# Patient Record
Sex: FEMALE | NOT HISPANIC OR LATINO | URBAN - METROPOLITAN AREA
[De-identification: names, ages, dates, MRNs, and addresses within clinical notes are randomized per-mention and may not be internally consistent; named-entity substitution may affect disease eponyms.]

---

## 2017-03-16 PROBLEM — J44.9 CHRONIC OBSTRUCTIVE PULMONARY DISEASE, UNSPECIFIED COPD TYPE (HCC): Status: ACTIVE | Noted: 2017-03-16

## 2017-03-29 PROBLEM — N87.0 CIN I (CERVICAL INTRAEPITHELIAL NEOPLASIA I): Status: ACTIVE | Noted: 2017-03-29

## 2017-03-29 PROCEDURE — 88175 CYTOPATH C/V AUTO FLUID REDO: CPT | Performed by: OBSTETRICS & GYNECOLOGY

## 2017-03-29 PROCEDURE — 87624 HPV HI-RISK TYP POOLED RSLT: CPT | Performed by: OBSTETRICS & GYNECOLOGY

## 2017-04-28 PROCEDURE — 88305 TISSUE EXAM BY PATHOLOGIST: CPT | Performed by: OBSTETRICS & GYNECOLOGY

## 2021-04-12 DIAGNOSIS — Z23 NEED FOR VACCINATION: ICD-10-CM

## 2022-12-12 ENCOUNTER — WALK IN (OUTPATIENT)
Dept: URGENT CARE | Age: 62
End: 2022-12-12

## 2022-12-12 VITALS
WEIGHT: 143 LBS | BODY MASS INDEX: 26.31 KG/M2 | SYSTOLIC BLOOD PRESSURE: 142 MMHG | RESPIRATION RATE: 28 BRPM | TEMPERATURE: 97.6 F | DIASTOLIC BLOOD PRESSURE: 80 MMHG | HEART RATE: 104 BPM | HEIGHT: 62 IN | OXYGEN SATURATION: 95 %

## 2022-12-12 DIAGNOSIS — J44.9 CHRONIC OBSTRUCTIVE PULMONARY DISEASE, UNSPECIFIED COPD TYPE (CMD): ICD-10-CM

## 2022-12-12 DIAGNOSIS — J45.909 BRONCHITIS WITH ASTHMA, ACUTE: Primary | ICD-10-CM

## 2022-12-12 DIAGNOSIS — J45.51 SEVERE PERSISTENT ASTHMA WITH ACUTE EXACERBATION: ICD-10-CM

## 2022-12-12 DIAGNOSIS — J20.9 BRONCHITIS WITH ASTHMA, ACUTE: Primary | ICD-10-CM

## 2022-12-12 DIAGNOSIS — J06.9 VIRAL UPPER RESPIRATORY TRACT INFECTION WITH COUGH: ICD-10-CM

## 2022-12-12 PROCEDURE — 99213 OFFICE O/P EST LOW 20 MIN: CPT | Performed by: NURSE PRACTITIONER

## 2022-12-12 RX ORDER — MONTELUKAST SODIUM 10 MG/1
TABLET ORAL
COMMUNITY
Start: 2022-09-07

## 2022-12-12 RX ORDER — FLUTICASONE PROPIONATE AND SALMETEROL 500; 50 UG/1; UG/1
1 POWDER RESPIRATORY (INHALATION)
COMMUNITY
Start: 2022-09-07 | End: 2022-12-12 | Stop reason: SDUPTHER

## 2022-12-12 RX ORDER — TIOTROPIUM BROMIDE 18 UG/1
CAPSULE ORAL; RESPIRATORY (INHALATION)
COMMUNITY
Start: 2022-10-18

## 2022-12-12 RX ORDER — BENZONATATE 100 MG/1
CAPSULE ORAL
Qty: 40 CAPSULE | Refills: 0 | Status: SHIPPED | OUTPATIENT
Start: 2022-12-12

## 2022-12-12 RX ORDER — PREDNISONE 20 MG/1
40 TABLET ORAL DAILY
Qty: 10 TABLET | Refills: 0 | Status: SHIPPED | OUTPATIENT
Start: 2022-12-12

## 2022-12-12 RX ORDER — FLUTICASONE PROPIONATE AND SALMETEROL 500; 50 UG/1; UG/1
1 POWDER RESPIRATORY (INHALATION) 2 TIMES DAILY
COMMUNITY

## 2022-12-12 RX ORDER — CETIRIZINE HYDROCHLORIDE 10 MG/1
TABLET ORAL
COMMUNITY
End: 2022-12-12 | Stop reason: SDUPTHER

## 2022-12-12 RX ORDER — ALBUTEROL SULFATE 90 UG/1
1-2 AEROSOL, METERED RESPIRATORY (INHALATION)
COMMUNITY
Start: 2022-10-17

## 2022-12-12 ASSESSMENT — ENCOUNTER SYMPTOMS
SHORTNESS OF BREATH: 0
FATIGUE: 1
CHILLS: 0
GASTROINTESTINAL NEGATIVE: 1
TROUBLE SWALLOWING: 0
HEADACHES: 1
PSYCHIATRIC NEGATIVE: 1
DIAPHORESIS: 0
SINUS PAIN: 0
EYES NEGATIVE: 1
RHINORRHEA: 1
SINUS PRESSURE: 0
SORE THROAT: 0
COUGH: 1
WHEEZING: 1
FEVER: 0

## 2024-03-01 RX ORDER — ROSUVASTATIN CALCIUM 20 MG/1
20 TABLET, COATED ORAL NIGHTLY
COMMUNITY

## 2024-03-01 RX ORDER — TIOTROPIUM BROMIDE 18 UG/1
18 CAPSULE ORAL; RESPIRATORY (INHALATION) DAILY
COMMUNITY

## 2024-03-01 RX ORDER — AZELASTINE 1 MG/ML
1 SPRAY, METERED NASAL 2 TIMES DAILY
COMMUNITY

## 2024-03-11 ENCOUNTER — HOSPITAL ENCOUNTER (OUTPATIENT)
Facility: HOSPITAL | Age: 64
Setting detail: HOSPITAL OUTPATIENT SURGERY
Discharge: HOME | End: 2024-03-11
Attending: INTERNAL MEDICINE | Admitting: INTERNAL MEDICINE
Payer: COMMERCIAL

## 2024-03-11 ENCOUNTER — ANESTHESIA EVENT (OUTPATIENT)
Dept: ENDOSCOPY | Facility: HOSPITAL | Age: 64
End: 2024-03-11
Payer: COMMERCIAL

## 2024-03-11 ENCOUNTER — ANESTHESIA (OUTPATIENT)
Dept: ENDOSCOPY | Facility: HOSPITAL | Age: 64
End: 2024-03-11
Payer: COMMERCIAL

## 2024-03-11 VITALS
OXYGEN SATURATION: 98 % | HEIGHT: 62 IN | WEIGHT: 149 LBS | DIASTOLIC BLOOD PRESSURE: 81 MMHG | BODY MASS INDEX: 27.42 KG/M2 | TEMPERATURE: 98 F | HEART RATE: 87 BPM | RESPIRATION RATE: 20 BRPM | SYSTOLIC BLOOD PRESSURE: 142 MMHG

## 2024-03-11 PROCEDURE — 0DBK8ZX EXCISION OF ASCENDING COLON, VIA NATURAL OR ARTIFICIAL OPENING ENDOSCOPIC, DIAGNOSTIC: ICD-10-PCS | Performed by: INTERNAL MEDICINE

## 2024-03-11 PROCEDURE — 0DBC8ZX EXCISION OF ILEOCECAL VALVE, VIA NATURAL OR ARTIFICIAL OPENING ENDOSCOPIC, DIAGNOSTIC: ICD-10-PCS | Performed by: INTERNAL MEDICINE

## 2024-03-11 PROCEDURE — 88305 TISSUE EXAM BY PATHOLOGIST: CPT | Performed by: INTERNAL MEDICINE

## 2024-03-11 DEVICE — REPLAY HEMOSTASIS CLIP, 11MM SPAN
Type: IMPLANTABLE DEVICE | Status: FUNCTIONAL
Brand: REPLAY

## 2024-03-11 RX ORDER — HYDROMORPHONE HYDROCHLORIDE 1 MG/ML
0.2 INJECTION, SOLUTION INTRAMUSCULAR; INTRAVENOUS; SUBCUTANEOUS EVERY 5 MIN PRN
Status: DISCONTINUED | OUTPATIENT
Start: 2024-03-11 | End: 2024-03-11

## 2024-03-11 RX ORDER — LABETALOL HYDROCHLORIDE 5 MG/ML
5 INJECTION, SOLUTION INTRAVENOUS EVERY 5 MIN PRN
Status: DISCONTINUED | OUTPATIENT
Start: 2024-03-11 | End: 2024-03-11

## 2024-03-11 RX ORDER — HYDROMORPHONE HYDROCHLORIDE 1 MG/ML
0.4 INJECTION, SOLUTION INTRAMUSCULAR; INTRAVENOUS; SUBCUTANEOUS EVERY 5 MIN PRN
Status: DISCONTINUED | OUTPATIENT
Start: 2024-03-11 | End: 2024-03-11

## 2024-03-11 RX ORDER — NALOXONE HYDROCHLORIDE 0.4 MG/ML
80 INJECTION, SOLUTION INTRAMUSCULAR; INTRAVENOUS; SUBCUTANEOUS AS NEEDED
Status: DISCONTINUED | OUTPATIENT
Start: 2024-03-11 | End: 2024-03-11

## 2024-03-11 RX ORDER — SODIUM CHLORIDE, SODIUM LACTATE, POTASSIUM CHLORIDE, CALCIUM CHLORIDE 600; 310; 30; 20 MG/100ML; MG/100ML; MG/100ML; MG/100ML
INJECTION, SOLUTION INTRAVENOUS CONTINUOUS
Status: DISCONTINUED | OUTPATIENT
Start: 2024-03-11 | End: 2024-03-11

## 2024-03-11 RX ORDER — HYDROMORPHONE HYDROCHLORIDE 1 MG/ML
0.6 INJECTION, SOLUTION INTRAMUSCULAR; INTRAVENOUS; SUBCUTANEOUS EVERY 5 MIN PRN
Status: DISCONTINUED | OUTPATIENT
Start: 2024-03-11 | End: 2024-03-11

## 2024-03-11 RX ORDER — MEPERIDINE HYDROCHLORIDE 25 MG/ML
12.5 INJECTION INTRAMUSCULAR; INTRAVENOUS; SUBCUTANEOUS AS NEEDED
Status: DISCONTINUED | OUTPATIENT
Start: 2024-03-11 | End: 2024-03-11

## 2024-03-11 RX ORDER — ONDANSETRON 2 MG/ML
4 INJECTION INTRAMUSCULAR; INTRAVENOUS EVERY 6 HOURS PRN
Status: DISCONTINUED | OUTPATIENT
Start: 2024-03-11 | End: 2024-03-11

## 2024-03-11 RX ORDER — HYDROCODONE BITARTRATE AND ACETAMINOPHEN 5; 325 MG/1; MG/1
2 TABLET ORAL ONCE AS NEEDED
Status: DISCONTINUED | OUTPATIENT
Start: 2024-03-11 | End: 2024-03-11

## 2024-03-11 RX ORDER — HYDROCODONE BITARTRATE AND ACETAMINOPHEN 5; 325 MG/1; MG/1
1 TABLET ORAL ONCE AS NEEDED
Status: DISCONTINUED | OUTPATIENT
Start: 2024-03-11 | End: 2024-03-11

## 2024-03-11 RX ORDER — PROCHLORPERAZINE EDISYLATE 5 MG/ML
5 INJECTION INTRAMUSCULAR; INTRAVENOUS EVERY 8 HOURS PRN
Status: DISCONTINUED | OUTPATIENT
Start: 2024-03-11 | End: 2024-03-11

## 2024-03-11 RX ORDER — MIDAZOLAM HYDROCHLORIDE 1 MG/ML
1 INJECTION INTRAMUSCULAR; INTRAVENOUS EVERY 5 MIN PRN
Status: DISCONTINUED | OUTPATIENT
Start: 2024-03-11 | End: 2024-03-11

## 2024-03-11 RX ORDER — ACETAMINOPHEN 500 MG
1000 TABLET ORAL ONCE AS NEEDED
Status: DISCONTINUED | OUTPATIENT
Start: 2024-03-11 | End: 2024-03-11

## 2024-03-11 NOTE — ANESTHESIA PREPROCEDURE EVALUATION
PRE-OP EVALUATION    Patient Name: Suzanne Shoemaker    Admit Diagnosis: ENCOUNTER FOR SCREENING COLONOSCOPY    Pre-op Diagnosis: ENCOUNTER FOR SCREENING COLONOSCOPY    COLONOSCOPY    Anesthesia Procedure: COLONOSCOPY    Surgeon(s) and Role:     * Malcom Newell MD - Primary    Pre-op vitals reviewed.  Temp: 98.2 °F (36.8 °C)  Pulse: 95  Resp: 20  BP: 173/84  SpO2: 97 %  Body mass index is 27.25 kg/m².    Current medications reviewed.  Hospital Medications:   lactated ringers infusion   Intravenous Continuous    lactated ringers infusion   Intravenous Continuous       Outpatient Medications:     Medications Prior to Admission   Medication Sig Dispense Refill Last Dose    tiotropium 18 MCG Inhalation Cap Inhale 1 capsule (18 mcg total) into the lungs daily.   Past Week    rosuvastatin 20 MG Oral Tab Take 1 tablet (20 mg total) by mouth nightly.   3/10/2024    azelastine 0.1 % Nasal Solution 1 spray by Nasal route 2 (two) times daily.   Past Week    albuterol 108 (90 Base) MCG/ACT Inhalation Aero Soln Inhale 1-2 puffs into the lungs every 4 to 6 hours as needed. 1 each 0 3/11/2024 at 1200    fluticasone-salmeterol (ADVAIR DISKUS) 500-50 MCG/DOSE Inhalation Aerosol Powder, Breath Activated INHALE 1 PUFF BY MOUTH TWICE DAILY 3 each 3 3/11/2024    Montelukast Sodium 10 MG Oral Tab Take 1 tablet (10 mg total) by mouth once daily. 90 tablet 3 Past Week    Calcium Carbonate-Vitamin D (CALCIUM + D OR) Take by mouth.   3/11/2024    ZYRTEC 10 MG OR TABS 1 TABLET DAILY/ PRN   3/10/2024    azithromycin (ZITHROMAX Z-LISA) 250 MG Oral Tab Take two tablets today, then one tablet daily for 4 more days 6 tablet 0 Unknown    azithromycin (ZITHROMAX Z-LISA) 250 MG Oral Tab Take two tablets today, then one tablet daily for 4 more days (Patient not taking: Reported on 10/26/2021) 6 tablet 0 Unknown       Allergies: Doxycycline      Anesthesia Evaluation    Patient summary reviewed.    Anesthetic Complications            GI/Hepatic/Renal    Negative GI/hepatic/renal ROS.                             Cardiovascular    Negative cardiovascular ROS.                                                   Endo/Other    Negative endo/other ROS.                              Pulmonary      (+) asthma  (+) COPD                   Neuro/Psych    Negative neuro/psych ROS.                          Patient Active Problem List:     Unspecified asthma(493.90)     Allergic rhinitis due to pollen     Allergic rhinitis due to animal (cat) (dog) hair and dander     Allergic rhinitis due to other allergen     Raynaud's syndrome     History of tobacco use     Chronic obstructive pulmonary disease, unspecified COPD type (HCC)     AMEE I (cervical intraepithelial neoplasia I)           Past Surgical History:   Procedure Laterality Date      x2    COLPOSCOPY, CERVIX W/UPPER ADJACENT VAGINA; W/BIOPSY(S), CERVIX  16    COLPOSCOPY,BX CERVIX/ENDOCERV CURR  2017    AMEE 1     Social History     Socioeconomic History    Marital status:    Occupational History    Occupation:    Tobacco Use    Smoking status: Every Day     Packs/day: 0.25     Years: 40.00     Additional pack years: 0.00     Total pack years: 10.00     Types: Cigarettes    Smokeless tobacco: Never   Substance and Sexual Activity    Alcohol use: Yes     Alcohol/week: 1.0 - 2.0 standard drink of alcohol     Types: 1 - 2 Standard drinks or equivalent per week     Comment: Weekends    Drug use: No    Sexual activity: Not Currently     Partners: Male   Other Topics Concern    Blood Transfusions No    Caffeine Concern No     Comment: 2-4 cups per day    Sleep Concern Yes     Comment: interrupted sleep but normal for her. no daytime somnolence    Stress Concern No    Weight Concern Yes    Exercise Yes     Comment: walking    Self-Exams Yes     History   Drug Use No     Available pre-op labs reviewed.               Airway      Mallampati: II  Mouth opening: >3 FB  TM distance: >  6 cm  Neck ROM: full Cardiovascular    Cardiovascular exam normal.         Dental    Dentition appears grossly intact         Pulmonary    Pulmonary exam normal.                 Other findings              ASA: 2   Plan: MAC  NPO status verified and patient meets guidelines.        Comment: Plan is MAC anesthesia, which likely will include deep sedation.  Implied that memory of procedure is unlikely although intraop recall, if it occurs, may be a reasonable and comfortable experience with this anesthetic.  Aware that general anesthesia is not intended though deep sedation may include brief moments of general anesthesia.   Questions answered. Accepts. The consent was signed without further questions.      Plan/risks discussed with: patient                Present on Admission:  **None**

## 2024-03-11 NOTE — H&P
[facility]   part of [facility]    History & Physical    Suzanne Shoemaker Patient Status:  Hospital Outpatient Surgery    1960 MRN [id number]   Location [facility] END[facility] Attending Malcom Newell MD   Hosp Day # 0 PCP Lc Ho DO     Date:  3/11/2024  Date of Admission:  3/11/2024    History provided by:patient  Chief Complaint:   colon cancer screening    HPI:   Suzanne Shoemaker is a(n) 63 year old female. Here for colon cancer screening    History     Past Medical History:   Diagnosis Date    Asthma (HCC)     Atypical squamous cells of undetermined significance (ASCUS) on Papanicolaou smear of cervix     AMEE I (cervical intraepithelial neoplasia I) 2016    AMEE I (cervical intraepithelial neoplasia I) 2017    COPD (chronic obstructive pulmonary disease) (HCC)     Environmental allergies     Hearing impairment     bilateral    High cholesterol     MENOPAUSE 2004    Vaginal atrophy     Visual impairment     reading glasses     Past Surgical History:   Procedure Laterality Date      x2    COLPOSCOPY, CERVIX W/UPPER ADJACENT VAGINA; W/BIOPSY(S), CERVIX  16    COLPOSCOPY,BX CERVIX/ENDOCERV CURR  2017    AMEE 1     Family History   Problem Relation Age of Onset    Lipids Mother     Hypertension Mother     Kidney Disease Mother         1 kidney    Other (Other) Mother     Hypertension Father     Other (Other) Father         emphysema    Diabetes Maternal Grandmother     Cancer Other         lymphoma    Hypertension Brother     Hypertension Sister      Social History:  Social History     Socioeconomic History    Marital status:    Occupational History    Occupation:    Tobacco Use    Smoking status: Every Day     Packs/day: 0.25     Years: 40.00     Additional pack years: 0.00     Total pack years: 10.00     Types: Cigarettes    Smokeless tobacco: Never   Substance and Sexual Activity    Alcohol use: Yes      Alcohol/week: 1.0 - 2.0 standard drink of alcohol     Types: 1 - 2 Standard drinks or equivalent per week     Comment: Weekends    Drug use: No    Sexual activity: Not Currently     Partners: Male   Other Topics Concern    Blood Transfusions No    Caffeine Concern No     Comment: 2-4 cups per day    Sleep Concern Yes     Comment: interrupted sleep but normal for her. no daytime somnolence    Stress Concern No    Weight Concern Yes    Exercise Yes     Comment: walking    Self-Exams Yes     Allergies/Medications:   Allergies:   Allergies   Allergen Reactions    Doxycycline FACE FLUSHING     Red face from taking - no SOB or CP  Was also no prednisone at the same time     Medications Prior to Admission   Medication Sig    tiotropium 18 MCG Inhalation Cap Inhale 1 capsule (18 mcg total) into the lungs daily.    rosuvastatin 20 MG Oral Tab Take 1 tablet (20 mg total) by mouth nightly.    azelastine 0.1 % Nasal Solution 1 spray by Nasal route 2 (two) times daily.    albuterol 108 (90 Base) MCG/ACT Inhalation Aero Soln Inhale 1-2 puffs into the lungs every 4 to 6 hours as needed.    fluticasone-salmeterol (ADVAIR DISKUS) 500-50 MCG/DOSE Inhalation Aerosol Powder, Breath Activated INHALE 1 PUFF BY MOUTH TWICE DAILY    Montelukast Sodium 10 MG Oral Tab Take 1 tablet (10 mg total) by mouth once daily.    Calcium Carbonate-Vitamin D (CALCIUM + D OR) Take by mouth.    ZYRTEC 10 MG OR TABS 1 TABLET DAILY/ PRN    azithromycin (ZITHROMAX Z-LISA) 250 MG Oral Tab Take two tablets today, then one tablet daily for 4 more days    azithromycin (ZITHROMAX Z-LISA) 250 MG Oral Tab Take two tablets today, then one tablet daily for 4 more days (Patient not taking: No sig reported)       Review of Systems:   Pertinent items are noted in HPI.  A comprehensive review of systems was negative.    Physical Exam:   Vital Signs:  Blood pressure (!) 173/84, pulse 95, temperature 98.2 °F (36.8 °C), temperature source Temporal, resp. rate 20, height 5' 2\"  (1.575 m), weight 149 lb (67.6 kg), SpO2 97%, not currently breastfeeding.     General appearance:  alert, appears stated age, and cooperative  Head: Normocephalic, without obvious abnormality, atraumatic  Pulmonary: clear to auscultation bilaterally  Cardiovascular: S1, S2 normal, no murmur, click, rub or gallop, regular rate and rhythm  Abdominal: soft, non-tender; bowel sounds normal; no masses,  no organomegaly  Extremities: extremities normal, atraumatic, no cyanosis or edema        Results:     Lab Results   Component Value Date    WBC 8.77 03/29/2017    HGB 14.1 03/29/2017    HCT 41.7 03/29/2017     03/29/2017    CREATSERUM 0.78 03/29/2017    BUN 14 03/29/2017     03/29/2017    K 4.6 03/29/2017     03/29/2017    CO2 31.1 03/29/2017    GLU 92 03/29/2017    ALB 3.6 03/29/2017    ALKPHO 116 (H) 03/29/2017    BILT 0.41 03/29/2017    TP 7.4 03/29/2017    AST 17 03/29/2017    ALT 28 03/29/2017    T4F 1.32 02/13/2016    TSH 0.287 (L) 03/29/2017       No results found.        Assessment/Plan:    colonoscopy      Malcom Newell MD  3/11/2024

## 2024-03-11 NOTE — DISCHARGE INSTRUCTIONS
ENDOSCOPY DISCHARGE INSTRUCTIONS    Procedure Performed:   Colonoscopy    Endoscopist: No name on file  FINDINGS:   Internal hemorrhoids and 2 Colon polyps (a growth in the colon)    MEDICATIONS:  You may resume all other medications today    DIET:  Resume Normal Diet    BIOPSIES:  Biopsy results will be released to you as soon as they are available as is now the law. This will not allow your physician the opportunity to go over these before they are released to you. It is not necessary to contact the office for explanation of these results. Your physician will contact you within a few business days of their release to review the findings with you    X-RAYS/LABS:   No X-rays/Labs were ordered today    ADDITIONAL RECOMMENDATIONS:        Activity for remainder of today:    REST TODAY  DO NOT drive or operate heavy machinery  DO NOT drink any alcoholic beverages  DO NOT sign any legal documents or make any important decisions    After your procedure(s):  It is not unusual to feel bloated or gassy .  Passing gas and belching is encouraged. Lying on your left side with your knees flexed may relieve the discomfort. A hot pack to the abdomen may also help.    After your gastroscopy (upper endoscopy): You may experience a slight sore throat which will subside. Throat lozenges or salt water gargle can be used.    FOLLOW-UP:  Contact the office at [phone] for follow-up appointment is needed or if you develop any of the following:    Severe abdominal pain/discomfort     Excessive bleeding                     Black tarry stool    Difficulty breathing/swallowing      Persistent nausea/vomiting  Fever above 100 degrees or chills

## 2024-03-11 NOTE — ANESTHESIA POSTPROCEDURE EVALUATION
[facility]    Suzanne Shoemaker Patient Status:  Hospital Outpatient Surgery   Age/Gender 63 year old female MRN [id number]   Location [facility] ENDOSCOPY PAIN [facility] Attending Malcom Newell MD   Hosp Day # 0 PCP Lc Ho DO       Anesthesia Post-op Note    COLONOSCOPY w/ HOT SNARE POLYPECTOMY AND COLD SNARE POLYPECTOMY AND CLIPPING X1    Procedure Summary       Date: 03/11/24 Room / Location:  ENDOSCOPY 03 /  ENDOSCOPY    Anesthesia Start: 1301 Anesthesia Stop: 1332    Procedure: COLONOSCOPY w/ HOT SNARE POLYPECTOMY AND COLD SNARE POLYPECTOMY AND CLIPPING X1 Diagnosis: (Polyps, Hemorrhoids)    Surgeons: Malcom Newell MD Anesthesiologist: Yosi Cedeño MD    Anesthesia Type: MAC ASA Status: 2            Anesthesia Type: No value filed.    Vitals Value Taken Time   /81 03/11/24 1339   Temp  03/11/24 1340   Pulse 87 03/11/24 1339   Resp 20 03/11/24 1340   SpO2 98 % 03/11/24 1339   Vitals shown include unfiled device data.    Patient Location: Endoscopy    Anesthesia Type: MAC    Airway Patency: patent    Postop Pain Control: adequate    Mental Status: mildly sedated but able to meaningfully participate in the post-anesthesia evaluation    Nausea/Vomiting: none    Cardiopulmonary/Hydration status: stable euvolemic    Complications: no apparent anesthesia related complications    Postop vital signs: stable    Dental Exam: Unchanged from Preop    Patient to be discharged home when criteria met.

## 2024-03-11 NOTE — OPERATIVE REPORT
Colonoscopy Operative Report    Suzanne Shoemaker Patient Status:  Hospital Outpatient Surgery    1960 MRN [id number]   [facility] ENDOSCOPY [facility]IN [facility] Attending Malcom Newell MD   Hosp Day #   0 PCP Lc Ho DO     Pre-Operative Diagnosis: ENCOUNTER FOR SCREENING COLONOSCOPY    Post-Operative Diagnosis:  6 mm polyp on ileocecal valve resected with hot snare and one hemoclip placed  One 4 mm ascending colon polyp resected with cold snare  Moderate internal hemorrhoids Grade I    Procedure Performed: Procedure(s):  COLONOSCOPY w/ HOT SNARE POLYPECTOMY AND COLD SNARE POLYPECTOMY AND CLIPPING X1     Informed Consent: Informed consent for both the procedure and sedation were obtained from the patient. The potentially life-threatening complications of sedation, bleeding,  perforation, transfusion or repeat endoscopy  were reviewed along with the possible need for hospitalization, surgical management, transfusion or repeat endoscopy should one of these complications arise. The patient understands and is agreeable to proceed.  Sedation Type: MAC-Patient received sedation with monitored anesthesia provided by an anesthesiologist      Cecum Withdrawal Time:  6 minutes  Date of previous colonoscopy: none    Procedure Description: The patient was placed in the left lateral decubitus position.  After careful digital rectal examination, the Pediatric colonoscope was inserted into the rectum and advanced to the level of the cecum under direct visualization. The cecum was identified by landmarks, including the appendiceal orifice and ileoceccal valve. Careful examination of the entire colon was performed during withdrawal of the endoscope. The scope was withdrawn to the rectum and retroflexion was performed.  The patient tolerated the procedure well with no immediate complications. The patient was transferred to the recovery area in stable  condition.  Quality of Preparation: Adequate  [place] Bowel Prep Scale:  good  Findings:   6 mm polyp on ileocecal valve resected with hot snare and one hemoclip placed  One 4 mm ascending colon polyp resected with cold snare  Moderate internal hemorrhoids Grade I    Recommendations: avid nsaids for 7 days, await pathology  Discharge:  The patient was given an after visit summary detailing the procedure, findings, recommendations and follow up plans.     Malcom Newell MD  3/11/2024  1:30 PM

## (undated) DEVICE — 1200CC GUARDIAN II: Brand: GUARDIAN

## (undated) DEVICE — KIT CUSTOM ENDOPROCEDURE STERIS

## (undated) DEVICE — LASSO POLYPECTOMY SNARE: Brand: LASSO

## (undated) DEVICE — KIT VLV 5 PC AIR H2O SUCT BX ENDOGATOR CONN

## (undated) DEVICE — TRAP POLYP W/ 2 SPEC TY CLR MAGNIFYING WIND

## (undated) DEVICE — 3M™ RED DOT™ MONITORING ELECTRODE WITH FOAM TAPE AND STICKY GEL, 50/BAG, 20/CASE, 72/PLT 2570: Brand: RED DOT™

## (undated) DEVICE — 10FT COMBINED O2 DELIVERY/CO2 MONITORING. FILTER WITH MICROSTREAM TYPE LUER: Brand: DUAL ADULT NASAL CANNULA